# Patient Record
Sex: MALE | Race: WHITE | Employment: UNEMPLOYED | URBAN - METROPOLITAN AREA
[De-identification: names, ages, dates, MRNs, and addresses within clinical notes are randomized per-mention and may not be internally consistent; named-entity substitution may affect disease eponyms.]

---

## 2017-10-05 ENCOUNTER — OFFICE VISIT (OUTPATIENT)
Dept: CARDIOLOGY CLINIC | Age: 42
End: 2017-10-05

## 2017-10-05 VITALS — WEIGHT: 159.4 LBS | OXYGEN SATURATION: 96 % | BODY MASS INDEX: 23.61 KG/M2 | HEART RATE: 58 BPM | HEIGHT: 69 IN

## 2017-10-05 DIAGNOSIS — I51.7 RIGHT VENTRICULAR ENLARGEMENT: ICD-10-CM

## 2017-10-05 DIAGNOSIS — Z72.0 TOBACCO USE: ICD-10-CM

## 2017-10-05 DIAGNOSIS — Z87.74 S/P VSD REPAIR: ICD-10-CM

## 2017-10-05 DIAGNOSIS — Q24.9 CONGENITAL HEART DISEASE: Primary | ICD-10-CM

## 2017-10-05 DIAGNOSIS — R55 VASOVAGAL SYNCOPE: ICD-10-CM

## 2017-10-05 DIAGNOSIS — R16.0 LIVER MASS: ICD-10-CM

## 2017-10-05 PROCEDURE — 99204 OFFICE O/P NEW MOD 45 MIN: CPT | Performed by: INTERNAL MEDICINE

## 2017-10-05 PROCEDURE — 93000 ELECTROCARDIOGRAM COMPLETE: CPT | Performed by: INTERNAL MEDICINE

## 2017-10-05 RX ORDER — LEVOTHYROXINE SODIUM 0.05 MG/1
50 TABLET ORAL DAILY
COMMUNITY
End: 2019-11-22

## 2017-10-05 RX ORDER — TRAZODONE HYDROCHLORIDE 50 MG/1
50 TABLET ORAL NIGHTLY
COMMUNITY
End: 2019-11-05

## 2017-10-05 RX ORDER — OXYCODONE HYDROCHLORIDE AND ACETAMINOPHEN 5; 325 MG/1; MG/1
1 TABLET ORAL EVERY 6 HOURS PRN
COMMUNITY
End: 2019-11-22

## 2017-10-05 NOTE — MR AVS SNAPSHOT
After Visit Summary             Kenya Prado   10/5/2017 2:00 PM   Office Visit    Description:  Male : 1975   Provider:  Mayra Burkett MD   Department:  [facility] - [facility] 9 and Future Appointments         Below is a list of your follow-up and future appointments. This may not be a complete list as you may have made appointments directly with providers that we are not aware of or your providers may have made some for you. Please call your providers to confirm appointments. It is important to keep your appointments. Please bring your current insurance card, photo ID, co-pay, and all medication bottles to your appointment. If self-pay, payment is expected at the time of service. Your To-Do List     Future Appointments Provider Department Dept Phone    11/10/2017 3:00 PM Mayra Burkett MD [facility] - [place] [phone]    Please arrive 15 minutes prior to appointment, bring photo ID and insurance card. Follow-Up    Return in about 1 month (around 2017). Information from Your Visit        Department     Name Address Phone Fax    [facility] - [address] [address] [address] for:         Comments    Congenital heart disease   [[id number]]         Vital Signs     Pulse Height Weight Oxygen Saturation Body Mass Index Smoking Status    58 5' 9\" (1.753 m) 159 lb 6.4 oz (72.3 kg) 96% 23.54 kg/m2 Current Every Day Smoker      Instructions         Stopping Smoking: Care Instructions  Your Care Instructions  Cigarette smokers crave the nicotine in cigarettes. Giving it up is much harder than simply changing a habit. Your body has to stop craving the nicotine. It is hard to quit, but you can do it. There are many tools that people use to quit smoking. You may find that combining tools works best for you. There are several steps to quitting. First you get ready to quit. Then you get support to help you. After that, you learn new skills and behaviors to become a nonsmoker. For many people, a necessary step is getting and using medicine. Your doctor will help you set up the plan that best meets your needs. You may want to attend a smoking cessation program to help you quit smoking. When you choose a program, look for one that has proven success. Ask your doctor for ideas. You will greatly increase your chances of success if you take medicine as well as get counseling or join a cessation program.  Some of the changes you feel when you first quit tobacco are uncomfortable. Your body will miss the nicotine at first, and you may feel short-tempered and grumpy. You may have trouble sleeping or concentrating. Medicine can help you deal with these symptoms. You may struggle with changing your smoking habits and rituals. The last step is the tricky one: Be prepared for the smoking urge to continue for a time. This is a lot to deal with, but keep at it. You will feel better. Follow-up care is a key part of your treatment and safety. Be sure to make and go to all appointments, and call your doctor if you are having problems. Its also a good idea to know your test results and keep a list of the medicines you take. How can you care for yourself at home? · Ask your family, friends, and coworkers for support. You have a better chance of quitting if you have help and support. · Join a support group, such as Nicotine Anonymous, for people who are trying to quit smoking. · Consider signing up for a smoking cessation program, such as the American Lung Association's Freedom from Smoking program.  · Set a quit date. Pick your date carefully so that it is not right in the middle of a big deadline or stressful time. Once you quit, do not even take a puff.  Get rid of all ashtrays and lighters after your last cigarette. Clean your house and your clothes so that they do not smell of smoke. · Learn how to be a nonsmoker. Think about ways you can avoid those things that make you reach for a cigarette. ¨ Avoid situations that put you at greatest risk for smoking. For some people, it is hard to have a drink with friends without smoking. For others, they might skip a coffee break with coworkers who smoke. ¨ Change your daily routine. Take a different route to work or eat a meal in a different place. · Cut down on stress. Calm yourself or release tension by doing an activity you enjoy, such as reading a book, taking a hot bath, or gardening. · Talk to your doctor or pharmacist about nicotine replacement therapy, which replaces the nicotine in your body. You still get nicotine but you do not use tobacco. Nicotine replacement products help you slowly reduce the amount of nicotine you need. These products come in several forms, many of them available over-the-counter:  ¨ Nicotine patches  ¨ Nicotine gum and lozenges  ¨ Nicotine inhaler  · Ask your doctor about bupropion (Wellbutrin) or varenicline (Chantix), which are prescription medicines. They do not contain nicotine. They help you by reducing withdrawal symptoms, such as stress and anxiety. · Some people find hypnosis, acupuncture, and massage helpful for ending the smoking habit. · Eat a healthy diet and get regular exercise. Having healthy habits will help your body move past its craving for nicotine. · Be prepared to keep trying. Most people are not successful the first few times they try to quit. Do not get mad at yourself if you smoke again. Make a list of things you learned and think about when you want to try again, such as next week, next month, or next year. Where can you learn more? Go to https://jeffery.milog. org and sign in to your Porphyrio account.  Enter V231 in the [employer] box to learn more about \"Stopping Smoking: Care Instructions. \"     If you do not have an account, please click on the \"Sign Up Now\" link. Current as of: March 20, 2017  Content Version: 11.3  © 8504-1407 [employer]. Care instructions adapted under license by [facility] ([facility]). If you have questions about a medical condition or this instruction, always ask your healthcare professional. [place] [address] any warranty or liability for your use of this information. Medications and Orders      Your Current Medications Are              oxyCODONE-acetaminophen (PERCOCET) 5-325 MG per tablet Take 1 tablet by mouth every 6 hours as needed for Pain . traZODone (DESYREL) 50 MG tablet Take 50 mg by mouth nightly    levothyroxine (SYNTHROID) 50 MCG tablet Take 50 mcg by mouth Daily      Allergies              Tylenol [Acetaminophen] Hives      We Ordered/Performed the following           EKG 12 Lead          Result Summary for EKG 12 Lead      Result Information     Status          Edited Result - FINAL (Resulted: 10/5/2017  1:48 PM)           10/5/2017  1:58 PM      Scans on Order [id number]            ECG on 10/5/2017  1:49 PM : ECG Report                     Additional Information        Basic Information     Date Of Birth Sex Race Ethnicity Preferred Language    1975 Male White Non-/Non  English      Problem List as of 10/5/2017  Date Reviewed: 10/5/2017                S/P VSD repair    Right ventricular enlargement    Vasovagal syncope    Congenital heart disease    Tobacco use      Preventive Care        Date Due    HIV screening is recommended for all people regardless of risk factors  aged 15-65 years at least once (lifetime) who have never been HIV tested.  1/15/1991    Tetanus Combination Vaccine (1 - Tdap) 1/15/1995    Cholesterol Screening 1/15/2016    Yearly Flu Vaccine (1) 9/1/2017            MyChart Signup

## 2017-10-05 NOTE — PROGRESS NOTES
needed for Pain .  traZODone (DESYREL) 50 MG tablet Take 50 mg by mouth nightly      levothyroxine (SYNTHROID) 50 MCG tablet Take 50 mcg by mouth Daily       No current facility-administered medications for this visit. Review of Systems:  · Constitutional: no unanticipated weight loss. There's been no change in energy level, sleep pattern, or activity level. No fevers, chills. · Eyes: No visual changes or diplopia. No scleral icterus. · ENT: No Headaches, hearing loss or vertigo. No mouth sores or sore throat. · Cardiovascular: as reviewed in HPI  · Respiratory: No cough or wheezing, no sputum production. No hematemesis. · Gastrointestinal: No abdominal pain, appetite loss, blood in stools. No change in bowel or bladder habits. · Genitourinary: No dysuria, trouble voiding, or hematuria. · Musculoskeletal:  No gait disturbance, no joint complaints. · Integumentary: No rash or pruritis. · Neurological: No headache, diplopia, change in muscle strength, numbness or tingling. · Psychiatric: No anxiety or depression. · Endocrine: No temperature intolerance. No excessive thirst, fluid intake, or urination. No tremor. · Hematologic/Lymphatic: No abnormal bruising or bleeding, blood clots or swollen lymph nodes. · Allergic/Immunologic: No nasal congestion or hives. Physical Exam:   Pulse 58  Ht 5' 9\" (1.753 m)  Wt 159 lb 6.4 oz (72.3 kg)  SpO2 96%  BMI 23.54 kg/m2  Wt Readings from Last 3 Encounters:   10/05/17 159 lb 6.4 oz (72.3 kg)     Constitutional: He is oriented to person, place, and time. He appears well-developed and well-nourished. In no acute distress. Head: Normocephalic and atraumatic. Pupils equal and round. Neck: Neck supple. No JVP or carotid bruit appreciated. No mass and no thyromegaly present. No lymphadenopathy present. Cardiovascular: Normal rate. Normal heart sounds. Exam reveals no gallop and no friction rub. No murmur heard.   Pulmonary/Chest: +median sternotomy

## 2017-10-05 NOTE — LETTER
needed for the mass. No additional cardiac testing would be requited before surgery if needed. F/u in office in 4-6 weeks. Thank you very much for allowing me to participate in the care of your patient. Please do not hesitate to contact me if you have any questions. Sincerely,      Rebekah Cuba.  Bridger Miles MD

## 2017-10-05 NOTE — PATIENT INSTRUCTIONS
Stopping Smoking: Care Instructions  Your Care Instructions  Cigarette smokers crave the nicotine in cigarettes. Giving it up is much harder than simply changing a habit. Your body has to stop craving the nicotine. It is hard to quit, but you can do it. There are many tools that people use to quit smoking. You may find that combining tools works best for you. There are several steps to quitting. First you get ready to quit. Then you get support to help you. After that, you learn new skills and behaviors to become a nonsmoker. For many people, a necessary step is getting and using medicine. Your doctor will help you set up the plan that best meets your needs. You may want to attend a smoking cessation program to help you quit smoking. When you choose a program, look for one that has proven success. Ask your doctor for ideas. You will greatly increase your chances of success if you take medicine as well as get counseling or join a cessation program.  Some of the changes you feel when you first quit tobacco are uncomfortable. Your body will miss the nicotine at first, and you may feel short-tempered and grumpy. You may have trouble sleeping or concentrating. Medicine can help you deal with these symptoms. You may struggle with changing your smoking habits and rituals. The last step is the tricky one: Be prepared for the smoking urge to continue for a time. This is a lot to deal with, but keep at it. You will feel better. Follow-up care is a key part of your treatment and safety. Be sure to make and go to all appointments, and call your doctor if you are having problems. Its also a good idea to know your test results and keep a list of the medicines you take. How can you care for yourself at home? · Ask your family, friends, and coworkers for support. You have a better chance of quitting if you have help and support.   · Join a support group, such as Nicotine Anonymous, for people who are trying to quit if you smoke again. Make a list of things you learned and think about when you want to try again, such as next week, next month, or next year. Where can you learn more? Go to https://Inkd.com.Blue Tornado. org and sign in to your Surefield account. Enter E000 in the [facility] box to learn more about \"Stopping Smoking: Care Instructions. \"     If you do not have an account, please click on the \"Sign Up Now\" link. Current as of: March 20, 2017  Content Version: 11.3  © 5625-7603 [employer] Incorporated. Care instructions adapted under license by [facility] ([facility]). If you have questions about a medical condition or this instruction, always ask your healthcare professional. [address] any warranty or liability for your use of this information.

## 2017-11-21 ENCOUNTER — OFFICE VISIT (OUTPATIENT)
Dept: CARDIOLOGY CLINIC | Age: 42
End: 2017-11-21

## 2017-11-21 VITALS
WEIGHT: 157.4 LBS | HEIGHT: 69 IN | DIASTOLIC BLOOD PRESSURE: 60 MMHG | SYSTOLIC BLOOD PRESSURE: 100 MMHG | BODY MASS INDEX: 23.31 KG/M2 | HEART RATE: 70 BPM | OXYGEN SATURATION: 97 %

## 2017-11-21 DIAGNOSIS — Z72.0 TOBACCO USE: ICD-10-CM

## 2017-11-21 DIAGNOSIS — Q24.9 CONGENITAL HEART DISEASE: Primary | ICD-10-CM

## 2017-11-21 DIAGNOSIS — I51.7 RIGHT VENTRICULAR ENLARGEMENT: ICD-10-CM

## 2017-11-21 DIAGNOSIS — Z87.74 S/P VSD REPAIR: ICD-10-CM

## 2017-11-21 DIAGNOSIS — R55 VASOVAGAL SYNCOPE: ICD-10-CM

## 2017-11-21 PROCEDURE — 99213 OFFICE O/P EST LOW 20 MIN: CPT | Performed by: INTERNAL MEDICINE

## 2017-11-21 NOTE — PATIENT INSTRUCTIONS
Patient Education        Stopping Smoking: Care Instructions  Your Care Instructions  Cigarette smokers crave the nicotine in cigarettes. Giving it up is much harder than simply changing a habit. Your body has to stop craving the nicotine. It is hard to quit, but you can do it. There are many tools that people use to quit smoking. You may find that combining tools works best for you. There are several steps to quitting. First you get ready to quit. Then you get support to help you. After that, you learn new skills and behaviors to become a nonsmoker. For many people, a necessary step is getting and using medicine. Your doctor will help you set up the plan that best meets your needs. You may want to attend a smoking cessation program to help you quit smoking. When you choose a program, look for one that has proven success. Ask your doctor for ideas. You will greatly increase your chances of success if you take medicine as well as get counseling or join a cessation program.  Some of the changes you feel when you first quit tobacco are uncomfortable. Your body will miss the nicotine at first, and you may feel short-tempered and grumpy. You may have trouble sleeping or concentrating. Medicine can help you deal with these symptoms. You may struggle with changing your smoking habits and rituals. The last step is the tricky one: Be prepared for the smoking urge to continue for a time. This is a lot to deal with, but keep at it. You will feel better. Follow-up care is a key part of your treatment and safety. Be sure to make and go to all appointments, and call your doctor if you are having problems. Its also a good idea to know your test results and keep a list of the medicines you take. How can you care for yourself at home? · Ask your family, friends, and coworkers for support. You have a better chance of quitting if you have help and support.   · Join a support group, such as Nicotine Anonymous, for people who are mad at yourself if you smoke again. Make a list of things you learned and think about when you want to try again, such as next week, next month, or next year. Where can you learn more? Go to https://Whispering Gibboncyndi.Population Genetics Technologies. org and sign in to your Kwaga account. Enter W195 in the [employer] box to learn more about \"Stopping Smoking: Care Instructions. \"     If you do not have an account, please click on the \"Sign Up Now\" link. Current as of: March 20, 2017  Content Version: 11.3  © 7606-3471 [employer] Incorporated. Care instructions adapted under license by [facility] ([facility]). If you have questions about a medical condition or this instruction, always ask your healthcare professional. [address] any warranty or liability for your use of this information.

## 2017-11-21 NOTE — LETTER
[address] Cardiology - [address] [facility] [address]  [address]   [address] [address]  Phone: [phone]  Fax: [phone]    Vicente Hines MD        November 30, 2017     [facility]  No address on file    Patient: Sunil Rojo  MR Number: [id number]  YOB: 1975  Date of Visit: 11/21/2017    Dear Dr. Caity Oropezase:    HPI:  The patient is 40 y.o. male with a past medical history significant for a VSD repair (1979 at [facility] in [place]), nicotine addiction, and a liver mass presents for follow-up for RV dilation. The liver mass was found incidentally on a CT scan when patient was admitted at [facility] for syncope. He has not completed work-up for the mass and established care in [place]. He denies recurrent syncope. He has no specific cardiac complaints today. He had an echo at [facility] which showed RV enlargement and reduced systolic function. Patient denies exertional chest pain/pressure, dyspnea at rest, worsening DAY, PND, orthopnea, palpitations, lightheadedness, weight changes, and LE edema. He reports abdominal pain, leg weakness, back pain, and difficulty walking. Assessment/Plan:     1. Congenital heart disease/VSD s/p surgical in 1979 at [facility]. No acute intervention required. 2. Right ventricular dilation and systolic dysfunction. Consider cardiac MRI with and without contrast to evaluate for residual shunt. Would defer testing until liver mass fully evaluated. No clinical evidence of CHF. 3. Nicotine Addiction. Encouraged smoking cessation but patient is in the contemplative stage. 4. Syncope. Thus far, unable to obtain results of tilt table study and outpatient heart monitor. Denies recurrence. 5. Liver mass. Being evaluated by oncology/GI. Advised pt to call office when workup complete.  There is no cardiac contraindication to surgery if needed for the mass. No additional cardiac testing would be required before surgery if needed. F/u in office in 4 months. Thank you very much for allowing me to participate in the care of your patient. Please do not hesitate to contact me if you have any questions. Sincerely,      Azalia Brar.  Gorge Chong MD

## 2017-11-21 NOTE — PROGRESS NOTES
heard.  Pulmonary/Chest: +median sternotomy scar. Effort normal and breath sounds normal. No respiratory distress. He has no wheezes, rhonchi or rales. Abdominal: Soft, non-tender. Bowel sounds are normal. He exhibits no organomegaly, mass or bruit. Extremities: No edema, cyanosis, or clubbing. Pulses are 2+ radial/carotid bilaterally. Neurological: No gross cranial nerve deficit. Coordination normal.   Skin: Skin is warm and dry. There is no rash or diaphoresis. Multiple tattoos. Psychiatric: He has a normal mood and affect. His speech is normal and behavior is normal.     Lab Review:   FLP:  No results found for: TRIG, HDL, LDLCALC, LDLDIRECT, LABVLDL  BUN/Creatinine:  No results found for: BUN, CREATININE     EKG Interpretation: 10/5/17 Sinus rhythm with frequent ectopic ventricular beats. Nonspecific ST-T abnormality. Image Review: Echo 4/2017 LVEF normal, RVE, mild-mod RV dysfx, no mention of shunt. Assessment/Plan:     1. Congenital heart disease/VSD s/p surgical in 1979 at [facility] in [place]. No acute intervention required. 2. Right ventricular dilation and systolic dysfunction. Consider cardiac MRI with and without contrast to evaluate for residual shunt. Would defer testing until liver mass fully evaluated. No clinical evidence of CHF. 3. Nicotine Addiction. Encouraged smoking cessation but patient is in the contemplative stage. 4. Syncope. Thus far, unable to obtain results of tilt table study and outpatient heart monitor. Denies recurrence. 5. Liver mass. Being evaluated by oncology/GI. Advised pt to call office when workup complete. There is no cardiac contraindication to surgery if needed for the mass. No additional cardiac testing would be required before surgery if needed. F/u in office in 4 months. Thank you very much for allowing me to participate in the care of your patient.  Please do not hesitate to contact me if you have any

## 2019-11-05 ENCOUNTER — HOSPITAL ENCOUNTER (EMERGENCY)
Age: 44
Discharge: HOME | End: 2019-11-05
Attending: EMERGENCY MEDICINE
Payer: COMMERCIAL

## 2019-11-05 VITALS
TEMPERATURE: 97.6 F | OXYGEN SATURATION: 99 % | WEIGHT: 139.99 LBS | DIASTOLIC BLOOD PRESSURE: 67 MMHG | BODY MASS INDEX: 20.73 KG/M2 | HEART RATE: 78 BPM | SYSTOLIC BLOOD PRESSURE: 106 MMHG | HEIGHT: 69 IN | RESPIRATION RATE: 17 BRPM

## 2019-11-05 DIAGNOSIS — G89.18 POSTOPERATIVE PAIN, ACUTE, SHOULDER: Primary | ICD-10-CM

## 2019-11-05 DIAGNOSIS — M25.512 CHRONIC LEFT SHOULDER PAIN: ICD-10-CM

## 2019-11-05 DIAGNOSIS — G89.29 CHRONIC LEFT SHOULDER PAIN: ICD-10-CM

## 2019-11-05 DIAGNOSIS — M25.519 POSTOPERATIVE PAIN, ACUTE, SHOULDER: Primary | ICD-10-CM

## 2019-11-05 PROCEDURE — 99283 EMERGENCY DEPT VISIT LOW MDM: CPT

## 2019-11-05 RX ORDER — OXYCODONE HYDROCHLORIDE AND ACETAMINOPHEN 5; 325 MG/1; MG/1
1 TABLET ORAL EVERY 6 HOURS PRN
Qty: 30 TABLET | Refills: 0 | Status: SHIPPED | OUTPATIENT
Start: 2019-11-05 | End: 2019-11-12

## 2019-11-05 ASSESSMENT — PAIN - FUNCTIONAL ASSESSMENT
PAIN_FUNCTIONAL_ASSESSMENT: PREVENTS OR INTERFERES SOME ACTIVE ACTIVITIES AND ADLS
PAIN_FUNCTIONAL_ASSESSMENT: PREVENTS OR INTERFERES SOME ACTIVE ACTIVITIES AND ADLS

## 2019-11-05 ASSESSMENT — PAIN DESCRIPTION - FREQUENCY: FREQUENCY: CONTINUOUS

## 2019-11-05 ASSESSMENT — PAIN DESCRIPTION - PROGRESSION
CLINICAL_PROGRESSION: NOT CHANGED
CLINICAL_PROGRESSION: GRADUALLY WORSENING

## 2019-11-05 ASSESSMENT — PAIN DESCRIPTION - PAIN TYPE
TYPE: ACUTE PAIN
TYPE: ACUTE PAIN

## 2019-11-05 ASSESSMENT — PAIN DESCRIPTION - LOCATION
LOCATION: SHOULDER
LOCATION: SHOULDER

## 2019-11-05 ASSESSMENT — PAIN DESCRIPTION - ORIENTATION
ORIENTATION: LEFT
ORIENTATION: LEFT

## 2019-11-05 ASSESSMENT — PAIN SCALES - GENERAL
PAINLEVEL_OUTOF10: 9
PAINLEVEL_OUTOF10: 9

## 2019-11-05 ASSESSMENT — PAIN DESCRIPTION - DESCRIPTORS
DESCRIPTORS: CONSTANT;SHARP
DESCRIPTORS: CONSTANT;SHARP

## 2019-11-20 ENCOUNTER — OFFICE VISIT (OUTPATIENT)
Dept: ORTHOPEDIC SURGERY | Age: 44
End: 2019-11-20
Payer: COMMERCIAL

## 2019-11-20 VITALS — BODY MASS INDEX: 20.59 KG/M2 | HEIGHT: 69 IN | WEIGHT: 139 LBS | RESPIRATION RATE: 16 BRPM

## 2019-11-20 DIAGNOSIS — S43.102S AC SEPARATION, LEFT, SEQUELA: Primary | ICD-10-CM

## 2019-11-20 PROCEDURE — G8427 DOCREV CUR MEDS BY ELIG CLIN: HCPCS | Performed by: ORTHOPAEDIC SURGERY

## 2019-11-20 PROCEDURE — G8420 CALC BMI NORM PARAMETERS: HCPCS | Performed by: ORTHOPAEDIC SURGERY

## 2019-11-20 PROCEDURE — 4004F PT TOBACCO SCREEN RCVD TLK: CPT | Performed by: ORTHOPAEDIC SURGERY

## 2019-11-20 PROCEDURE — G8484 FLU IMMUNIZE NO ADMIN: HCPCS | Performed by: ORTHOPAEDIC SURGERY

## 2019-11-20 PROCEDURE — 99203 OFFICE O/P NEW LOW 30 MIN: CPT | Performed by: ORTHOPAEDIC SURGERY

## 2019-11-20 RX ORDER — MELOXICAM 7.5 MG/1
7.5 TABLET ORAL DAILY
Qty: 30 TABLET | Refills: 0 | Status: SHIPPED | OUTPATIENT
Start: 2019-11-20 | End: 2022-03-15

## 2019-11-20 RX ORDER — TRAMADOL HYDROCHLORIDE 50 MG/1
50 TABLET ORAL EVERY 8 HOURS PRN
Qty: 15 TABLET | Refills: 0 | Status: SHIPPED | OUTPATIENT
Start: 2019-11-20 | End: 2019-11-25

## 2019-11-20 RX ORDER — TRAMADOL HYDROCHLORIDE 50 MG/1
50 TABLET ORAL EVERY 8 HOURS PRN
Qty: 15 TABLET | Refills: 0 | Status: SHIPPED | OUTPATIENT
Start: 2019-11-20 | End: 2019-11-22

## 2019-11-22 ENCOUNTER — HOSPITAL ENCOUNTER (EMERGENCY)
Age: 44
Discharge: HOME | End: 2019-11-22
Attending: EMERGENCY MEDICINE
Payer: COMMERCIAL

## 2019-11-22 VITALS
SYSTOLIC BLOOD PRESSURE: 118 MMHG | DIASTOLIC BLOOD PRESSURE: 76 MMHG | OXYGEN SATURATION: 100 % | RESPIRATION RATE: 17 BRPM | TEMPERATURE: 98.6 F | HEIGHT: 69 IN | WEIGHT: 149.25 LBS | BODY MASS INDEX: 22.11 KG/M2 | HEART RATE: 62 BPM

## 2019-11-22 DIAGNOSIS — M25.512 ACUTE PAIN OF LEFT SHOULDER: Primary | ICD-10-CM

## 2019-11-22 PROCEDURE — 99283 EMERGENCY DEPT VISIT LOW MDM: CPT

## 2019-11-22 ASSESSMENT — PAIN DESCRIPTION - DESCRIPTORS: DESCRIPTORS: CONSTANT

## 2019-11-22 ASSESSMENT — PAIN DESCRIPTION - PAIN TYPE: TYPE: ACUTE PAIN

## 2019-11-22 ASSESSMENT — PAIN - FUNCTIONAL ASSESSMENT: PAIN_FUNCTIONAL_ASSESSMENT: PREVENTS OR INTERFERES SOME ACTIVE ACTIVITIES AND ADLS

## 2019-11-22 ASSESSMENT — PAIN DESCRIPTION - LOCATION: LOCATION: SHOULDER

## 2019-11-22 ASSESSMENT — PAIN DESCRIPTION - PROGRESSION: CLINICAL_PROGRESSION: GRADUALLY WORSENING

## 2019-11-22 ASSESSMENT — PAIN SCALES - GENERAL
PAINLEVEL_OUTOF10: 8
PAINLEVEL_OUTOF10: 8

## 2019-11-22 ASSESSMENT — PAIN DESCRIPTION - ORIENTATION: ORIENTATION: LEFT

## 2019-11-22 ASSESSMENT — PAIN DESCRIPTION - FREQUENCY: FREQUENCY: CONTINUOUS

## 2022-03-15 ENCOUNTER — APPOINTMENT (OUTPATIENT)
Dept: GENERAL RADIOLOGY | Age: 47
End: 2022-03-15

## 2022-03-15 ENCOUNTER — HOSPITAL ENCOUNTER (EMERGENCY)
Age: 47
Discharge: HOME | End: 2022-03-16
Attending: EMERGENCY MEDICINE

## 2022-03-15 DIAGNOSIS — R07.9 CHEST PAIN, UNSPECIFIED TYPE: Primary | ICD-10-CM

## 2022-03-15 PROCEDURE — 36415 COLL VENOUS BLD VENIPUNCTURE: CPT

## 2022-03-15 PROCEDURE — 83690 ASSAY OF LIPASE: CPT

## 2022-03-15 PROCEDURE — 71045 X-RAY EXAM CHEST 1 VIEW: CPT

## 2022-03-15 PROCEDURE — 80053 COMPREHEN METABOLIC PANEL: CPT

## 2022-03-15 PROCEDURE — 85025 COMPLETE CBC W/AUTO DIFF WBC: CPT

## 2022-03-15 PROCEDURE — 93005 ELECTROCARDIOGRAM TRACING: CPT | Performed by: EMERGENCY MEDICINE

## 2022-03-15 PROCEDURE — 84484 ASSAY OF TROPONIN QUANT: CPT

## 2022-03-15 PROCEDURE — 99285 EMERGENCY DEPT VISIT HI MDM: CPT

## 2022-03-15 ASSESSMENT — PAIN - FUNCTIONAL ASSESSMENT
PAIN_FUNCTIONAL_ASSESSMENT: 0-10
PAIN_FUNCTIONAL_ASSESSMENT: ACTIVITIES ARE NOT PREVENTED

## 2022-03-15 ASSESSMENT — PAIN DESCRIPTION - FREQUENCY: FREQUENCY: CONTINUOUS

## 2022-03-15 ASSESSMENT — PAIN DESCRIPTION - PROGRESSION: CLINICAL_PROGRESSION: GRADUALLY WORSENING

## 2022-03-15 ASSESSMENT — PAIN DESCRIPTION - LOCATION: LOCATION: CHEST

## 2022-03-15 ASSESSMENT — PAIN DESCRIPTION - PAIN TYPE: TYPE: ACUTE PAIN

## 2022-03-15 ASSESSMENT — PAIN DESCRIPTION - ORIENTATION: ORIENTATION: LEFT

## 2022-03-15 ASSESSMENT — PAIN DESCRIPTION - ONSET: ONSET: SUDDEN

## 2022-03-15 ASSESSMENT — PAIN DESCRIPTION - DESCRIPTORS: DESCRIPTORS: PRESSURE

## 2022-03-15 ASSESSMENT — PAIN DESCRIPTION - DIRECTION: RADIATING_TOWARDS: LEFT RIBS

## 2022-03-15 ASSESSMENT — PAIN SCALES - GENERAL: PAINLEVEL_OUTOF10: 10

## 2022-03-16 VITALS
WEIGHT: 145.28 LBS | OXYGEN SATURATION: 97 % | SYSTOLIC BLOOD PRESSURE: 112 MMHG | HEART RATE: 89 BPM | HEIGHT: 67 IN | RESPIRATION RATE: 26 BRPM | DIASTOLIC BLOOD PRESSURE: 73 MMHG | BODY MASS INDEX: 22.8 KG/M2 | TEMPERATURE: 98.4 F

## 2022-03-16 LAB
A/G RATIO: 1.6 (ref 1.1–2.2)
ALBUMIN SERPL-MCNC: 4.2 G/DL (ref 3.4–5)
ALP BLD-CCNC: 61 U/L (ref 40–129)
ALT SERPL-CCNC: 17 U/L (ref 10–40)
ANION GAP SERPL CALCULATED.3IONS-SCNC: 13 MMOL/L (ref 3–16)
AST SERPL-CCNC: 20 U/L (ref 15–37)
BASOPHILS ABSOLUTE: 0 K/UL (ref 0–0.2)
BASOPHILS RELATIVE PERCENT: 0 %
BILIRUB SERPL-MCNC: <0.2 MG/DL (ref 0–1)
BUN BLDV-MCNC: 21 MG/DL (ref 7–20)
CALCIUM SERPL-MCNC: 9.1 MG/DL (ref 8.3–10.6)
CHLORIDE BLD-SCNC: 109 MMOL/L (ref 99–110)
CO2: 22 MMOL/L (ref 21–32)
CREAT SERPL-MCNC: 1 MG/DL (ref 0.9–1.3)
EOSINOPHILS ABSOLUTE: 0 K/UL (ref 0–0.6)
EOSINOPHILS RELATIVE PERCENT: 0 %
GFR AFRICAN AMERICAN: >60
GFR NON-AFRICAN AMERICAN: >60
GLUCOSE BLD-MCNC: 126 MG/DL (ref 70–99)
HCT VFR BLD CALC: 42.6 % (ref 40.5–52.5)
HEMOGLOBIN: 14.1 G/DL (ref 13.5–17.5)
LIPASE: 18 U/L (ref 13–60)
LYMPHOCYTES ABSOLUTE: 1.9 K/UL (ref 1–5.1)
LYMPHOCYTES RELATIVE PERCENT: 28 %
MCH RBC QN AUTO: 29.2 PG (ref 26–34)
MCHC RBC AUTO-ENTMCNC: 33.2 G/DL (ref 31–36)
MCV RBC AUTO: 88 FL (ref 80–100)
MONOCYTES ABSOLUTE: 0.4 K/UL (ref 0–1.3)
MONOCYTES RELATIVE PERCENT: 6 %
NEUTROPHILS ABSOLUTE: 4.5 K/UL (ref 1.7–7.7)
NEUTROPHILS RELATIVE PERCENT: 66 %
PDW BLD-RTO: 12.9 % (ref 12.4–15.4)
PLATELET # BLD: 321 K/UL (ref 135–450)
PLATELET SLIDE REVIEW: ADEQUATE
PMV BLD AUTO: 7.4 FL (ref 5–10.5)
POTASSIUM REFLEX MAGNESIUM: 4 MMOL/L (ref 3.5–5.1)
RBC # BLD: 4.84 M/UL (ref 4.2–5.9)
RBC # BLD: NORMAL 10*6/UL
SLIDE REVIEW: NORMAL
SODIUM BLD-SCNC: 144 MMOL/L (ref 136–145)
TOTAL PROTEIN: 6.9 G/DL (ref 6.4–8.2)
TROPONIN: <0.01 NG/ML
WBC # BLD: 6.8 K/UL (ref 4–11)

## 2022-03-16 ASSESSMENT — PAIN DESCRIPTION - LOCATION: LOCATION: CHEST

## 2022-03-16 ASSESSMENT — PAIN DESCRIPTION - PAIN TYPE: TYPE: ACUTE PAIN

## 2022-03-16 ASSESSMENT — PAIN SCALES - GENERAL
PAINLEVEL_OUTOF10: 9
PAINLEVEL_OUTOF10: 10

## 2022-03-16 ASSESSMENT — PAIN DESCRIPTION - ORIENTATION: ORIENTATION: LEFT;UPPER

## 2022-03-16 ASSESSMENT — PAIN DESCRIPTION - FREQUENCY: FREQUENCY: CONTINUOUS

## 2022-03-16 ASSESSMENT — HEART SCORE: ECG: 1

## 2022-03-16 ASSESSMENT — PAIN DESCRIPTION - DESCRIPTORS: DESCRIPTORS: PRESSURE

## 2022-03-16 ASSESSMENT — PAIN DESCRIPTION - DIRECTION: RADIATING_TOWARDS: LEFT RIBS

## 2022-03-16 NOTE — ED NOTES
Dr. Ambrose Mcgovern in room to discuss test results and discharge plan of care with patient.  [employer] officer at bedside and patient is currently under arrest.      La Pimentel RN  03/16/22 [id number]

## 2022-03-16 NOTE — ED TRIAGE NOTES
Patient presents in wheelchair in police custody to Room 3 with c/o chest pain. Patient states he has had chest pain all day, decided to have it checked out when placed under arrest by [facility] police. He states his pain is a constant pressure on the left side of his chest radiating to left ribs. Patient states pain is 10/10 on VAS. Also c/o intermittent extremity twitching that he states, \"might be seizures\". Patient was able to stop twitching his lower extremities when asked to do so for EKG. He reports cardiac surgery when he was a child and another surgery in 2017 to remove sternal wires. He also states he feels like he is going to pass out. He is awake, alert, oriented, respirations easy & regular, skin w/d, MMM & pink, cap refill brisk. Cardiac monitor placed on patient and he is sinus rhythm. Dr. Donna Alexander in room to examine patient.

## 2022-03-16 NOTE — ED PROVIDER NOTES
CHIEF COMPLAINT  Chief Complaint   Patient presents with    Chest Pain     Patient arrived in custody of [facility] with c/o chest pain all day. Upon being arrested, patient states chest pain and twiching worsened. c/o constant chest pressure       HISTORY OF PRESENT ILLNESS  Ros Langston is a 56 y.o. male who presents to the ED complaining of onset of left anterior chest pain, sharp, intermittent in quality that started 12 hours ago. He reports no shortness of breath. No cough. Patient reports chronic abdominal pain from what he reports to be liver and pancreas cancer. Patient reports this pain does not change. No nausea vomiting. No leg pain. No calf pain. No leg swelling. No back pain. No jaw pain or arm pain. No radiation of pain from the chest.  No arm paresthesias. Patient has a history of congenital heart disease with some type of septal defect that he reports he had repaired at age 2. Patient was brought to the emergency room by police officers after he was arrested for several felony warrants and he began to complain of chest pain at that time. No other complaints, modifying factors or associated symptoms. Nursing notes reviewed. Past Medical History:   Diagnosis Date    Congenital heart disease     Liver mass     Thyroid disease     Tobacco use     Cessation discussed     Past Surgical History:   Procedure Laterality Date    SHOULDER SURGERY Left 10/23/2019    [facility]ament repair.     VENTRICULAR SEPTAL DEFECT CLOSURE       Family History   Problem Relation Age of Onset    Heart Attack Mother     Heart Disease Mother     Heart Attack Father     Heart Disease Father     Heart Disease Brother     Cancer Maternal Uncle      Social History     Socioeconomic History    Marital status: Legally      Spouse name: Not on file    Number of children: Not on file    Years of education: Not on file    Highest education level: Not on file   Occupational History    Not on file Tobacco Use    Smoking status: Current Every Day Smoker     Packs/day: 3.00     Years: 23.00     Pack years: 69.00     Types: Cigarettes    Smokeless tobacco: Never Used   Vaping Use    Vaping Use: Never used   Substance and Sexual Activity    Alcohol use: No    Drug use: Yes     Types: Marijuana Rigo Piper)    Sexual activity: Not on file   Other Topics Concern    Not on file   Social History Narrative    Not on file     Social Determinants of Health     Financial Resource Strain:     Difficulty of Paying Living Expenses: Not on file   Food Insecurity:     Worried About Running Out of Food in the Last Year: Not on file    Rafael of Food in the Last Year: Not on file   Transportation Needs:     Lack of Transportation (Medical): Not on file    Lack of Transportation (Non-Medical): Not on file   Physical Activity:     Days of Exercise per Week: Not on file    Minutes of Exercise per Session: Not on file   Stress:     Feeling of Stress : Not on file   Social Connections:     Frequency of Communication with Friends and Family: Not on file    Frequency of Social Gatherings with Friends and Family: Not on file    Attends Shinto Services: Not on file    Active Member of [address] [place] [address] [employer] or Organizations: Not on file    Attends Club or Organization Meetings: Not on file    Marital Status: Not on file   Intimate Partner Violence:     Fear of Current or Ex-Partner: Not on file    Emotionally Abused: Not on file    Physically Abused: Not on file    Sexually Abused: Not on file   Housing Stability:     Unable to Pay for Housing in the Last Year: Not on file    Number of [place] in the Last Year: Not on file    Unstable Housing in the Last Year: Not on file     No current facility-administered medications for this encounter. No current outpatient medications on file.      Allergies   Allergen Reactions    Ibuprofen      Passes out    Tylenol With Codeine #3 [Acetaminophen-Codeine] Hives       REVIEW Differential   Result Value Ref Range    WBC 6.8 4.0 - 11.0 K/uL    RBC 4.84 4.20 - 5.90 M/uL    Hemoglobin 14.1 13.5 - 17.5 g/dL    Hematocrit 42.6 40.5 - 52.5 %    MCV 88.0 80.0 - 100.0 fL    MCH 29.2 26.0 - 34.0 pg    MCHC 33.2 31.0 - 36.0 g/dL    RDW 12.9 12.4 - 15.4 %    Platelets 717 772 - 126 K/uL    MPV 7.4 5.0 - 10.5 fL    PLATELET SLIDE REVIEW Adequate     SLIDE REVIEW see below     Neutrophils % 66.0 %    Lymphocytes % 28.0 %    Monocytes % 6.0 %    Eosinophils % 0.0 %    Basophils % 0.0 %    Neutrophils Absolute 4.5 1.7 - 7.7 K/uL    Lymphocytes Absolute 1.9 1.0 - 5.1 K/uL    Monocytes Absolute 0.4 0.0 - 1.3 K/uL    Eosinophils Absolute 0.0 0.0 - 0.6 K/uL    Basophils Absolute 0.0 0.0 - 0.2 K/uL    RBC Morphology Normal    Comprehensive Metabolic Panel w/ Reflex to MG   Result Value Ref Range    Sodium 144 136 - 145 mmol/L    Potassium reflex Magnesium 4.0 3.5 - 5.1 mmol/L    Chloride 109 99 - 110 mmol/L    CO2 22 21 - 32 mmol/L    Anion Gap 13 3 - 16    Glucose 126 (H) 70 - 99 mg/dL    BUN 21 (H) 7 - 20 mg/dL    CREATININE 1.0 0.9 - 1.3 mg/dL    GFR Non-African American >60 >60    GFR African American >60 >60    Calcium 9.1 8.3 - 10.6 mg/dL    Total Protein 6.9 6.4 - 8.2 g/dL    Albumin 4.2 3.4 - 5.0 g/dL    Albumin/Globulin Ratio 1.6 1.1 - 2.2    Total Bilirubin <0.2 0.0 - 1.0 mg/dL    Alkaline Phosphatase 61 40 - 129 U/L    ALT 17 10 - 40 U/L    AST 20 15 - 37 U/L   Lipase   Result Value Ref Range    Lipase 18.0 13.0 - 60.0 U/L   Troponin   Result Value Ref Range    Troponin <0.01 <0.01 ng/mL           ED COURSE/MDM  Chest pain: Heart score of 4. EKG reveals nonspecific changes that are identical to to previous EKGs from 2017 and 2012. Patient has had chest pain for more than 12 hours with a normal troponin and he is deemed reasonable for discharge home and close outpatient management.   Review of his chart reveals that in 2021, patient had an identical presentation in which she was arrested by police officers, complaint of chest pain and was taken to [facility], evaluated in the emergency room with a negative work-up before going to [facility]. This presentation is similar to today. Patient does not have complaints or physical findings are consistent with DVT or pulmonary embolism and it is thought that this diagnosis is very unlikely in this patient. He is deemed reasonable for discharge from the emergency room with close outpatient management. I estimate there is LOW risk for ACUTE CORONARY SYNDROME, PULMONARY EMBOLISM, AORTIC DISSECTION, PNEUMONIA, PNEUMOTHORAX, ACUTE CONGESTIVE HEART FAILURE, SEPSIS, DKA, ESOPHAGEAL RUPTURE, THORACIC ANEURYSM, thus I consider the discharge disposition reasonable. We also discussed returning to the Emergency Department immediately if new or worsening symptoms occur. We have discussed the symptoms which are most concerning (e.g., bloody sputum, fever, worsening pain or shortness of breath, vomiting) that necessitate immediate return. Patient was given scripts for the following medications. I counseled patient how to take these medications. New Prescriptions    No medications on file         CLINICAL IMPRESSION  1. Chest pain, unspecified type        Blood pressure 112/73, pulse 89, temperature 97.7 °F (36.5 °C), temperature source Tympanic, resp. rate 26, height 5' 7\" (1.702 m), weight 145 lb 4.5 oz (65.9 kg), SpO2 97 %. Follow-up with:  No follow-up provider specified.         Libia Le MD  03/16/22 0074

## 2022-03-16 NOTE — ED NOTES
Patient sleeping, arouses to name. States pain is 10/10 on VAS. Patient has not had any extremity movements while asleep. Immediately returns to sleep. [employer] officer at bedside with patient.           Saranya Jeff RN  03/16/22 2609

## 2022-03-17 LAB
EKG ATRIAL RATE: 76 BPM
EKG DIAGNOSIS: NORMAL
EKG P AXIS: 77 DEGREES
EKG P-R INTERVAL: 126 MS
EKG Q-T INTERVAL: 386 MS
EKG QRS DURATION: 118 MS
EKG QTC CALCULATION (BAZETT): 434 MS
EKG R AXIS: 68 DEGREES
EKG T AXIS: 99 DEGREES
EKG VENTRICULAR RATE: 76 BPM

## 2022-03-17 PROCEDURE — 93010 ELECTROCARDIOGRAM REPORT: CPT | Performed by: INTERNAL MEDICINE

## 2022-10-15 ENCOUNTER — HOSPITAL ENCOUNTER (EMERGENCY)
Age: 47
Discharge: HOME | End: 2022-10-15
Payer: MEDICAID

## 2022-10-15 ENCOUNTER — APPOINTMENT (OUTPATIENT)
Dept: GENERAL RADIOLOGY | Age: 47
End: 2022-10-15
Payer: MEDICAID

## 2022-10-15 VITALS
OXYGEN SATURATION: 98 % | HEART RATE: 91 BPM | DIASTOLIC BLOOD PRESSURE: 86 MMHG | RESPIRATION RATE: 22 BRPM | BODY MASS INDEX: 22.17 KG/M2 | TEMPERATURE: 97.8 F | HEIGHT: 69 IN | SYSTOLIC BLOOD PRESSURE: 121 MMHG | WEIGHT: 149.69 LBS

## 2022-10-15 DIAGNOSIS — S52.225A CLOSED NONDISPLACED TRANSVERSE FRACTURE OF SHAFT OF LEFT ULNA, INITIAL ENCOUNTER: Primary | ICD-10-CM
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
## 2022-10-15 PROCEDURE — 99284 EMERGENCY DEPT VISIT MOD MDM: CPT

## 2022-10-15 PROCEDURE — 73080 X-RAY EXAM OF ELBOW: CPT

## 2022-10-15 PROCEDURE — 6360000002 HC RX W HCPCS: Performed by: GENERAL ACUTE CARE HOSPITAL

## 2022-10-15 PROCEDURE — 29105 APPLICATION LONG ARM SPLINT: CPT

## 2022-10-15 PROCEDURE — 96372 THER/PROPH/DIAG INJ SC/IM: CPT

## 2022-10-15 PROCEDURE — 6370000000 HC RX 637 (ALT 250 FOR IP): Performed by: GENERAL ACUTE CARE HOSPITAL

## 2022-10-15 PROCEDURE — 73030 X-RAY EXAM OF SHOULDER: CPT

## 2022-10-15 PROCEDURE — 73090 X-RAY EXAM OF FOREARM: CPT

## 2022-10-15 RX ORDER — KETOROLAC TROMETHAMINE 30 MG/ML
60 INJECTION, SOLUTION INTRAMUSCULAR; INTRAVENOUS ONCE
Status: COMPLETED | OUTPATIENT
Start: 2022-10-15 | End: 2022-10-15

## 2022-10-15 RX ORDER — OXYCODONE HYDROCHLORIDE AND ACETAMINOPHEN 5; 325 MG/1; MG/1
1 TABLET ORAL ONCE
Status: COMPLETED | OUTPATIENT
Start: 2022-10-15 | End: 2022-10-15

## 2022-10-15 RX ORDER — HYDROCODONE BITARTRATE AND ACETAMINOPHEN 5; 325 MG/1; MG/1
1 TABLET ORAL EVERY 6 HOURS PRN
Qty: 12 TABLET | Refills: 0 | Status: SHIPPED | OUTPATIENT
Start: 2022-10-15 | End: 2022-10-18

## 2022-10-15 RX ADMIN — OXYCODONE AND ACETAMINOPHEN 1 TABLET: 5; 325 TABLET ORAL at 16:11

## 2022-10-15 RX ADMIN — KETOROLAC TROMETHAMINE 60 MG: 30 INJECTION, SOLUTION INTRAMUSCULAR at 16:11

## 2022-10-15 ASSESSMENT — ENCOUNTER SYMPTOMS
BACK PAIN: 0
CHEST TIGHTNESS: 0
SORE THROAT: 0
WHEEZING: 0
VOICE CHANGE: 0
ABDOMINAL PAIN: 0
NAUSEA: 0
SHORTNESS OF BREATH: 0
COUGH: 0
VOMITING: 0

## 2022-10-15 ASSESSMENT — PAIN SCALES - GENERAL
PAINLEVEL_OUTOF10: 10
PAINLEVEL_OUTOF10: 4
PAINLEVEL_OUTOF10: 10
PAINLEVEL_OUTOF10: 4

## 2022-10-15 ASSESSMENT — PAIN - FUNCTIONAL ASSESSMENT
PAIN_FUNCTIONAL_ASSESSMENT: NONE - DENIES PAIN
PAIN_FUNCTIONAL_ASSESSMENT: 0-10

## 2022-10-15 ASSESSMENT — PAIN DESCRIPTION - ORIENTATION: ORIENTATION: LEFT

## 2022-10-15 ASSESSMENT — PAIN DESCRIPTION - LOCATION: LOCATION: HAND;ARM

## 2022-10-15 NOTE — DISCHARGE INSTRUCTIONS
Leave splint in place until seen by orthopedics. Keep the affected extremity elevated as much as possible. Take the prescribed Norco as directed. Return for high fever, incessant vomiting, severe pain, any other worsening symptoms.

## 2022-10-15 NOTE — ED PROVIDER NOTES
[facility]        Pt Name: Ricardo Payne  MRN: [id number]  [place] 1975  Date of evaluation: 10/15/2022  Provider: DOMENICA Hernandez CNP  PCP: No primary care provider on file. Note Started: 5:14 PM EDT       GAIL. I have evaluated this patient. My supervising physician was available for consultation. CHIEF COMPLAINT       Chief Complaint   Patient presents with    Arm Injury     Patient fell off a ladder last night at 1730 and is unable to move his left arm 10/10       HISTORY OF PRESENT ILLNESS   (Location, Timing/Onset, Context/Setting, Quality, Duration, Modifying Factors, Severity, Associated Signs and Symptoms)  Note limiting factors. Chief Complaint:     Ricardo Payne is a 56 y.o. male who presents emergency department today reporting a left arm injury. Patient reports sustained a mechanical fall off of a ladder last night at approximately 5:30 PM.  Patient states that he did not hit his head or lose consciousness. He reports falling directly onto the left elbow and forearm. Patient also reports some left shoulder pain and states that he had shoulder surgery in July. Patient is right-hand dominant. He reports a pain level of 8 out of 10. He describes the pain as constant, dull, and throbbing with sharp pains that occur with minimal movement. He states the pain radiates down his entire arm. He has not taken anything for his symptoms. He states that he has otherwise felt well and has been without fever, chills, or other symptoms. Nursing Notes were all reviewed and agreed with or any disagreements were addressed in the HPI. REVIEW OF SYSTEMS    (2-9 systems for level 4, 10 or more for level 5)     Review of Systems   Constitutional:  Negative for chills, fatigue and fever. HENT:  Negative for congestion, sore throat and voice change. Eyes:  Negative for visual disturbance.    Respiratory: Negative for cough, chest tightness, shortness of breath and wheezing. Cardiovascular:  Negative for chest pain and palpitations. Gastrointestinal:  Negative for abdominal pain, nausea and vomiting. Endocrine: Negative for polydipsia and polyuria. Genitourinary:  Negative for difficulty urinating, dysuria and flank pain. Musculoskeletal:  Positive for arthralgias, joint swelling and myalgias. Negative for back pain, gait problem, neck pain and neck stiffness. Skin:  Negative for rash and wound. Allergic/Immunologic: Negative for immunocompromised state. Neurological:  Negative for dizziness, weakness and light-headedness. Hematological:  Does not bruise/bleed easily. Psychiatric/Behavioral:  Negative for sleep disturbance and suicidal ideas. Positives and Pertinent negatives as per HPI. Except as noted above in the ROS, all other systems were reviewed and negative. PAST MEDICAL HISTORY     Past Medical History:   Diagnosis Date    Congenital heart disease     Liver mass     Thyroid disease     Tobacco use     Cessation discussed         SURGICAL HISTORY     Past Surgical History:   Procedure Laterality Date    SHOULDER SURGERY Left 10/23/2019    [facility] Ligament repair. VENTRICULAR SEPTAL DEFECT CLOSURE           CURRENTMEDICATIONS       Discharge Medication List as of 10/15/2022  7:08 PM            ALLERGIES     Ibuprofen and Tylenol with codeine #3 [acetaminophen-codeine]    FAMILYHISTORY       Family History   Problem Relation Age of Onset    Heart Attack Mother     Heart Disease Mother     Heart Attack Father     Heart Disease Father     Heart Disease Brother     Cancer Maternal Uncle           SOCIAL HISTORY       Social History     Tobacco Use    Smoking status: Every Day     Packs/day: 3.00     Years: 23.00     Pack years: 69.00     Types: Cigarettes    Smokeless tobacco: Never   Vaping Use    Vaping Use: Never used   Substance Use Topics    Alcohol use: No    Drug use:  Yes Types: Marijuana (Weed)       SCREENINGS    Edgardo Coma Scale  Eye Opening: Spontaneous  Best Verbal Response: Oriented  Best Motor Response: Obeys commands  Raymond Coma Scale Score: 15        PHYSICAL EXAM    (up to 7 for level 4, 8 or more for level 5)     ED Triage Vitals [10/15/22 1528]   BP Temp Temp Source Heart Rate Resp SpO2 Height Weight   121/86 97.8 °F (36.6 °C) Oral 91 22 98 % 5' 9\" (1.753 m) 149 lb 11.1 oz (67.9 kg)       Physical Exam  Vitals and nursing note reviewed. Constitutional:       General: He is in acute distress. Appearance: Normal appearance. He is not ill-appearing. HENT:      Head: Normocephalic and atraumatic. Right Ear: External ear normal.      Left Ear: External ear normal.      Nose: Nose normal.      Mouth/Throat:      Mouth: Mucous membranes are moist.      Pharynx: Oropharynx is clear. Eyes:      General:         Right eye: No discharge. Left eye: No discharge. Extraocular Movements: Extraocular movements intact. Conjunctiva/sclera: Conjunctivae normal.      Pupils: Pupils are equal, round, and reactive to light. Cardiovascular:      Rate and Rhythm: Normal rate and regular rhythm. Pulses: Normal pulses. Heart sounds: Normal heart sounds. Pulmonary:      Effort: Pulmonary effort is normal. No respiratory distress. Breath sounds: Normal breath sounds. Abdominal:      General: Bowel sounds are normal.      Palpations: Abdomen is soft. Tenderness: There is no abdominal tenderness. There is no right CVA tenderness or left CVA tenderness. Musculoskeletal:      Left shoulder: Tenderness and bony tenderness present. No swelling, deformity, effusion, laceration or crepitus. Decreased range of motion. Normal strength. Normal pulse. Left elbow: Swelling present. No deformity, effusion or lacerations. Decreased range of motion. Tenderness present in radial head.       Left forearm: Swelling, tenderness and bony tenderness present. No deformity. Left wrist: Swelling present. No tenderness, bony tenderness, snuff box tenderness or crepitus. Normal pulse. Cervical back: Normal range of motion and neck supple. No rigidity or tenderness. Right lower leg: No edema. Left lower leg: No edema. Comments: Left arm is without erythema, or ecchymosis. Skin is intact. Range of motion is limited due to pain. There is exquisite tenderness to palpation to the mid forearm. Skin:     General: Skin is warm and dry. Capillary Refill: Capillary refill takes less than 2 seconds. Neurological:      General: No focal deficit present. Mental Status: He is alert and oriented to person, place, and time. Psychiatric:         Mood and Affect: Mood normal.         Behavior: Behavior normal.         Thought Content: Thought content normal.         Judgment: Judgment normal.       DIAGNOSTIC RESULTS   LABS:    Labs Reviewed - No data to display    When ordered only abnormal lab results are displayed. All other labs were within normal range or not returned as of this dictation. EKG: When ordered, EKG's are interpreted by the Emergency Department Physician in the absence of a cardiologist.  Please see their note for interpretation of EKG. RADIOLOGY:   Non-plain film images such as CT, Ultrasound and MRI are read by the radiologist. Plain radiographic images are visualized and preliminarily interpreted by the ED Provider with the below findings:        Interpretation per the Radiologist below, if available at the time of this note:    XR RADIUS ULNA LEFT (2 VIEWS)   Final Result   1. Acute ulnar fracture. XR ELBOW LEFT (MIN 3 VIEWS)   Final Result   1. Acute ulnar fracture. XR SHOULDER LEFT (MIN 2 VIEWS)   Final Result   No acute osseous abnormality.            XR ELBOW LEFT (MIN 3 VIEWS)    Result Date: 10/15/2022  EXAMINATION: THREE XRAY VIEWS OF THE LEFT ELBOW; TWO XRAY VIEWS OF THE LEFT FOREARM 10/15/2022 4:11 pm; 10/15/2022 4:23 pm COMPARISON: None. HISTORY: ORDERING SYSTEM PROVIDED HISTORY: injury TECHNOLOGIST PROVIDED HISTORY: Reason for exam:->injury Reason for Exam: fell off ladder last night; ORDERING SYSTEM PROVIDED HISTORY: pain TECHNOLOGIST PROVIDED HISTORY: Reason for exam:->pain Reason for Exam: fell off ladder last night FINDINGS: There is an acute comminuted minimally displaced fracture involving the mid ulnar diaphysis. There is no dislocation or joint effusion limited by the obliquity of the lateral projection. The bones are normally mineralized. There are no bony destructive lesions. The joint spaces are maintained. 1. Acute ulnar fracture. XR RADIUS ULNA LEFT (2 VIEWS)    Result Date: 10/15/2022  EXAMINATION: THREE XRAY VIEWS OF THE LEFT ELBOW; TWO XRAY VIEWS OF THE LEFT FOREARM 10/15/2022 4:11 pm; 10/15/2022 4:23 pm COMPARISON: None. HISTORY: ORDERING SYSTEM PROVIDED HISTORY: injury TECHNOLOGIST PROVIDED HISTORY: Reason for exam:->injury Reason for Exam: fell off ladder last night; ORDERING SYSTEM PROVIDED HISTORY: pain TECHNOLOGIST PROVIDED HISTORY: Reason for exam:->pain Reason for Exam: fell off ladder last night FINDINGS: There is an acute comminuted minimally displaced fracture involving the mid ulnar diaphysis. There is no dislocation or joint effusion limited by the obliquity of the lateral projection. The bones are normally mineralized. There are no bony destructive lesions. The joint spaces are maintained. 1. Acute ulnar fracture. XR SHOULDER LEFT (MIN 2 VIEWS)    Result Date: 10/15/2022  EXAMINATION: THREE XRAY VIEWS OF THE LEFT SHOULDER 10/15/2022 4:11 pm COMPARISON: 11/20/2019 HISTORY: Acute pain status post fall. FINDINGS: Interval postsurgical changes of the left distal clavicle. No acute fracture seen. No malalignment. Fractured sternal wires appear to have been present on chest radiograph from 03/16/2022. No acute osseous abnormality. PROCEDURES   Unless otherwise noted below, none     Procedures    CRITICAL CARE TIME   none    CONSULTS:  None      EMERGENCY DEPARTMENT COURSE and DIFFERENTIAL DIAGNOSIS/MDM:   Vitals:    Vitals:    10/15/22 1528   BP: 121/86   Pulse: 91   Resp: 22   Temp: 97.8 °F (36.6 °C)   TempSrc: Oral   SpO2: 98%   Weight: 149 lb 11.1 oz (67.9 kg)   Height: 5' 9\" (1.753 m)       Patient was given the following medications:  Medications   oxyCODONE-acetaminophen (PERCOCET) 5-325 MG per tablet 1 tablet (1 tablet Oral Given 10/15/22 1611)   ketorolac (TORADOL) injection 60 mg (60 mg IntraMUSCular Given 10/15/22 1611)         Is this patient to be included in the SEP-1 Core Measure due to severe sepsis or septic shock? No   Exclusion criteria - the patient is NOT to be included for SEP-1 Core Measure due to: Infection is not suspected    Previous records reviewed in order to gain further information regarding patient's PMH as well as her HPI. Nursing notes reviewed. This is a 41-year-old male who presents to the emergency department today reporting a left arm injury after sustaining mechanical fall yesterday. He did not hit his head or lose consciousness. He is not anticoagulated. He denies other injury. Patient is right-hand dominant. Physical exam complete. Patient arrives nontoxic, afebrile, normotensive. He does appear uncomfortable. There is no evidence of compartment syndrome. Left upper extremity neurovascular status intact. Patient is medicated for discomfort. Imaging studies interpreted by radiologist and reviewed by myself show a midshaft left ulnar fracture, see by report for full details. Posterior long-arm splint applied by nursing staff. It was applied appropriately and the patient was neurovascularly intact as observed by myself. Arm sling provided. At this time there is no evidence of any life-threatening or emergent conditions requiring immediate intervention.   Patient will be discharged with emphasis on close outpatient follow-up with listed orthopedic physician. Urgent Ortho referral provided. Patient agrees to follow-up as directed. He agrees to leave splint in place until seen by Ortho. Prescription for Norco provided. Patient advised to apply ice for 20 minutes every 3-4 hours. Patient agrees return for high fever, incessant vomiting, severe pain, any other worsening symptoms. Patient is discharged home in stable condition. FINAL IMPRESSION      1. Closed nondisplaced transverse fracture of shaft of left ulna, initial encounter    2. Fall, initial encounter          DISPOSITION/PLAN   DISPOSITION Decision To Discharge 10/15/2022 07:14:07 PM      PATIENT REFERRED TO:  Cherylene Second, [address] [facility] [address]  [address] [place] [address]  [phone]    In 2 days  ortho    [facility]) Physicians Pre-Service  [phone]  In 1 day  to establish primary care    DISCHARGE MEDICATIONS:  Discharge Medication List as of 10/15/2022  7:08 PM        START taking these medications    Details   HYDROcodone-acetaminophen (NORCO) 5-325 MG per tablet Take 1 tablet by mouth every 6 hours as needed for Pain for up to 3 days. Intended supply: 3 days.  Take lowest dose possible to manage pain, Disp-12 tablet, R-0Print             DISCONTINUED MEDICATIONS:  Discharge Medication List as of 10/15/2022  7:08 PM                 (Please note that portions of this note were completed with a voice recognition program.  Efforts were made to edit the dictations but occasionally words are mis-transcribed.)    DOMENICA Harding CNP (electronically signed)            DOMENICA Harding CNP  10/19/22 1936

## 2022-10-15 NOTE — ED TRIAGE NOTES
Charlotte Ortiz is a 56 y.o. male that brought himself to the ER for eval of left arm injury. The patient fell off a ladder last night and is unable to move his arm. The patient is alert and oriented with an open and patent airway. The patients hand feels cold and no palpable pulse but found on doppler and marked.

## 2022-10-15 NOTE — ED NOTES
D/C: Order noted for d/c. Pt confirmed d/c paperwork have correct name. Discharge and education instructions reviewed with patient. Teach-back successful. Pt verbalized understanding and signed d/c papers. Pt denied questions at this time. No acute distress noted. Patient instructed to follow-up as noted - return to emergency department if symptoms worsen. Patient verbalized understanding. Discharged per EDMD with discharge instructions. Pt discharged to private vehicle. Patient stable upon departure. Thanked patient for choosing [facility] for care. Provider aware of patient pain at time of discharge.        Belkys Arellano RN  10/15/22 0274

## 2022-10-17 ENCOUNTER — TELEPHONE (OUTPATIENT)
Dept: ORTHOPEDIC SURGERY | Age: 47
End: 2022-10-17

## 2022-10-17 NOTE — TELEPHONE ENCOUNTER
Did leave message for patient to call and schedule appointment from referral. Upon return call please schedule with Dr. Zina Self.

## 2022-10-18 NOTE — TELEPHONE ENCOUNTER
2ND ATTEMPT- Left message for patient to return call if they would like to schedule a follow up appointment. Upon return call please schedule appt with Dr. Leatha Poon

## 2022-10-19 NOTE — TELEPHONE ENCOUNTER
Patient daughter has called and said that she hasnt heard from her father in 3 years. He put her number down for his number but she hasn't heard from him. She said she knows he has a phone but does not have the number.

## 2022-10-19 NOTE — TELEPHONE ENCOUNTER
3rd ATTEMPT- Left message for patient to return call if they would like to schedule a follow up appointment. Upon return call please schedule appt with Dr. Xena Gay will be mailed out